# Patient Record
Sex: MALE | Race: BLACK OR AFRICAN AMERICAN
[De-identification: names, ages, dates, MRNs, and addresses within clinical notes are randomized per-mention and may not be internally consistent; named-entity substitution may affect disease eponyms.]

---

## 2019-04-08 ENCOUNTER — HOSPITAL ENCOUNTER (EMERGENCY)
Dept: HOSPITAL 61 - ER | Age: 64
Discharge: HOME | End: 2019-04-08
Payer: COMMERCIAL

## 2019-04-08 VITALS — HEIGHT: 67 IN | BODY MASS INDEX: 22.6 KG/M2 | WEIGHT: 144 LBS

## 2019-04-08 VITALS — SYSTOLIC BLOOD PRESSURE: 155 MMHG | DIASTOLIC BLOOD PRESSURE: 93 MMHG
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
## 2019-04-08 DIAGNOSIS — S82.891A: Primary | ICD-10-CM
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
## 2019-04-08 PROCEDURE — 29515 APPLICATION SHORT LEG SPLINT: CPT

## 2019-04-08 PROCEDURE — 73630 X-RAY EXAM OF FOOT: CPT

## 2019-04-08 PROCEDURE — 73610 X-RAY EXAM OF ANKLE: CPT

## 2019-04-08 NOTE — RAD
Right ankle and foot radiograph 4/8/2019 9:47 AM

 

INDICATION: Lateral right ankle pain after fall one week ago. History of 

ankle fracture.

 

COMPARISON: None available.

 

TECHNIQUE:  3 views of the right foot and 3 views the right ankle are 

provided.

 

FINDINGS:

 

There is a nondisplaced fracture involving the lateral malleolus, 3 cm 

from the distal tip, without definite extension to the ankle mortise. 

Ankle mortise is congruent. Tibial plafond and talar dome are intact.. 

There is mild hallux valgus deformity. Vascular calcifications are 

present. Bone mineralization is within normal limits. Joint spaces are 

maintained. Regional soft tissues are within normal limits. There is no 

soft tissue gas or osseous erosion.

 

IMPRESSION:

 

Nondisplaced fracture involving the lateral malleolus at the level of the 

tibial plafond without extension to the ankle mortise. There is associated

soft tissue swelling.

 

Electronically signed by: Ivy Hartmann MD (4/8/2019 10:19 AM) 

[id number]

## 2019-04-08 NOTE — PHYS DOC
Adult General


Chief Complaint


Chief Complaint:  ANKLE PROBLEM





HPI


HPI





Patient is a 63 year old male presents to the ED complaining of right ankle 

injury times one week ago. Patient states he was walking and twisted his right 

ankle. Patient states he's been soaking it and icing it but the pain persists 

to his right lateral ankle. Describes the pain as sharp. Rates the pain as 5 

out of 10. Denies head/neck injury, LOC, vision changes, symptoms prior to fall

, use of blood thinners, calf pain/swelling, paresthesias, or weakness.





Review of Systems


Review of Systems





Constitutional: Denies fever or chills []


Respiratory: Denies cough or shortness of breath []


Cardiovascular: No additional information not addressed in HPI []


GI: Denies abdominal pain, nausea, vomiting, bloody stools or diarrhea []


: Denies dysuria or hematuria []


Musculoskeletal: Complains of right ankle pain. Denies back pain or joint pain [

]


Integument: Denies rash or skin lesions []


Neurologic: Denies headache, focal weakness or sensory changes []


Endocrine: Denies polyuria or polydipsia []





All other systems were reviewed and found to be within normal limits, except as 

documented in this note.





Allergies


Allergies





Allergies








Coded Allergies Type Severity Reaction Last Updated Verified


 


  No Known Drug Allergies    4/8/19 No











Physical Exam


Physical Exam





Constitutional: Well developed, well nourished, no acute distress, non-toxic 

appearance. []


HENT: Normocephalic, atraumatic


Neck: Normal range of motion, no tenderness, supple, no stridor. [] 


Cardiovascular:Heart rate regular rhythm, no murmur []


Lungs & Thorax:  Bilateral breath sounds clear to auscultation []


Skin: Warm, dry, no erythema, no rash. [] 


Back: No tenderness, no CVA tenderness. [] 


Extremities: Mild right lateral ankle tenderness/swelling. FROM. NV intact. No 

overlying skin changes, no cyanosis, no clubbing, ROM intact, no edema. [] 


Neurologic: Alert and oriented X 3, normal motor function, normal sensory 

function, no focal deficits noted. []


Psychologic: Affect normal, judgement normal, mood normal. []





Current Patient Data


Vital Signs





 Vital Signs








  Date Time  Temp Pulse Resp B/P (MAP) Pulse Ox O2 Delivery O2 Flow Rate FiO2


 


4/8/19 09:38 97.8 102 18 155/93 (113) 95 Room Air  





 97.8       











EKG


EKG


[]





Radiology/Procedures


Radiology/Procedures


PROCEDURE: ANKLE RIGHT 3V





Right ankle and foot radiograph 4/8/2019 9:47 AM


 


INDICATION: Lateral right ankle pain after fall one week ago. History of 


ankle fracture.


 


COMPARISON: None available.


 


TECHNIQUE:  3 views of the right foot and 3 views the right ankle are 


provided.


 


FINDINGS:


 


There is a nondisplaced fracture involving the lateral malleolus, 3 cm 


from the distal tip, without definite extension to the ankle mortise. 


Ankle mortise is congruent. Tibial plafond and talar dome are intact.. 


There is mild hallux valgus deformity. Vascular calcifications are 


present. Bone mineralization is within normal limits. Joint spaces are 


maintained. Regional soft tissues are within normal limits. There is no 


soft tissue gas or osseous erosion.


 


IMPRESSION:


 


Nondisplaced fracture involving the lateral malleolus at the level of the 


tibial plafond without extension to the ankle mortise. There is associated


soft tissue swelling.[]





Course & Med Decision Making


Course & Med Decision Making


Pertinent Labs and Imaging studies reviewed. (See chart for details)





[]Discussed imaging findings with patient. Patient's pain improved in the ED. 

Patient placed in splint. Neurovascular intact post placement. Patient given 

crutches. Discussed remaining nonweightbearing until seen by orthopedic this 

week. Provided contact information/education. Discussed reasons to return to 

the ED. Patient understands and agrees with plan.





Dragon Disclaimer


Dragon Disclaimer


This electronic medical record was generated, in whole or in part, using a 

voice recognition dictation system.





Departure


Departure


Impression:  


 Primary Impression:  


 Ankle fracture


Disposition:  01 HOME, SELF-CARE


Condition:  IMPROVED


Referrals:  


NON,STAFF (PCP)








CHALINO CAMP MD


Patient Instructions:  Ankle Fracture


Scripts


Hydrocodone/Apap 5-325 (NORCO 5-325 TABLET) 1 Each Tablet


1 TAB PO BID for 4 Days, #8 TAB


   Prov: LUIS HERRMANN         4/8/19











LUIS HERRMANN Apr 8, 2019 09:52 I have reviewed discharge instructions with the patient. The patient verbalized understanding.

## 2025-07-08 NOTE — RAD
Right ankle and foot radiograph 4/8/2019 9:47 AM

 

INDICATION: Lateral right ankle pain after fall one week ago. History of 

ankle fracture.

 

COMPARISON: None available.

 

TECHNIQUE:  3 views of the right foot and 3 views the right ankle are 

provided.

 

FINDINGS:

 

There is a nondisplaced fracture involving the lateral malleolus, 3 cm 

from the distal tip, without definite extension to the ankle mortise. 

Ankle mortise is congruent. Tibial plafond and talar dome are intact.. 

There is mild hallux valgus deformity. Vascular calcifications are 

present. Bone mineralization is within normal limits. Joint spaces are 

maintained. Regional soft tissues are within normal limits. There is no 

soft tissue gas or osseous erosion.

 

IMPRESSION:

 

Nondisplaced fracture involving the lateral malleolus at the level of the 

tibial plafond without extension to the ankle mortise. There is associated

soft tissue swelling.

 

Electronically signed by: Ivy Hartmann MD (4/8/2019 10:19 AM) 

[id number] maximum assist (25% patients effort)